# Patient Record
Sex: MALE | Race: BLACK OR AFRICAN AMERICAN
[De-identification: names, ages, dates, MRNs, and addresses within clinical notes are randomized per-mention and may not be internally consistent; named-entity substitution may affect disease eponyms.]

---

## 2020-03-05 ENCOUNTER — HOSPITAL ENCOUNTER (EMERGENCY)
Dept: HOSPITAL 62 - ER | Age: 60
Discharge: HOME | End: 2020-03-05
Payer: SELF-PAY

## 2020-03-05 VITALS — SYSTOLIC BLOOD PRESSURE: 136 MMHG | DIASTOLIC BLOOD PRESSURE: 92 MMHG

## 2020-03-05 DIAGNOSIS — K08.409: ICD-10-CM

## 2020-03-05 DIAGNOSIS — R63.4: ICD-10-CM

## 2020-03-05 DIAGNOSIS — K76.0: ICD-10-CM

## 2020-03-05 DIAGNOSIS — Z79.899: ICD-10-CM

## 2020-03-05 DIAGNOSIS — K05.10: ICD-10-CM

## 2020-03-05 DIAGNOSIS — F17.210: ICD-10-CM

## 2020-03-05 DIAGNOSIS — Z91.038: ICD-10-CM

## 2020-03-05 DIAGNOSIS — I10: ICD-10-CM

## 2020-03-05 DIAGNOSIS — R10.13: Primary | ICD-10-CM

## 2020-03-05 LAB
ADD MANUAL DIFF: NO
ALBUMIN SERPL-MCNC: 4.2 G/DL (ref 3.5–5)
ALP SERPL-CCNC: 102 U/L (ref 38–126)
ANION GAP SERPL CALC-SCNC: 5 MMOL/L (ref 5–19)
APPEARANCE UR: CLEAR
APTT PPP: YELLOW S
AST SERPL-CCNC: 33 U/L (ref 17–59)
BASOPHILS # BLD AUTO: 0 10^3/UL (ref 0–0.2)
BASOPHILS NFR BLD AUTO: 0.4 % (ref 0–2)
BILIRUB DIRECT SERPL-MCNC: 0 MG/DL (ref 0–0.4)
BILIRUB SERPL-MCNC: 0.4 MG/DL (ref 0.2–1.3)
BILIRUB UR QL STRIP: NEGATIVE
BUN SERPL-MCNC: 13 MG/DL (ref 7–20)
CALCIUM: 9.6 MG/DL (ref 8.4–10.2)
CHLORIDE SERPL-SCNC: 108 MMOL/L (ref 98–107)
CO2 SERPL-SCNC: 26 MMOL/L (ref 22–30)
EOSINOPHIL # BLD AUTO: 0.1 10^3/UL (ref 0–0.6)
EOSINOPHIL NFR BLD AUTO: 2.1 % (ref 0–6)
ERYTHROCYTE [DISTWIDTH] IN BLOOD BY AUTOMATED COUNT: 13.9 % (ref 11.5–14)
GLUCOSE SERPL-MCNC: 88 MG/DL (ref 75–110)
GLUCOSE UR STRIP-MCNC: NEGATIVE MG/DL
HCT VFR BLD CALC: 43.2 % (ref 37.9–51)
HGB BLD-MCNC: 14.7 G/DL (ref 13.5–17)
KETONES UR STRIP-MCNC: (no result) MG/DL
LYMPHOCYTES # BLD AUTO: 2.5 10^3/UL (ref 0.5–4.7)
LYMPHOCYTES NFR BLD AUTO: 36.7 % (ref 13–45)
MCH RBC QN AUTO: 31.6 PG (ref 27–33.4)
MCHC RBC AUTO-ENTMCNC: 34.1 G/DL (ref 32–36)
MCV RBC AUTO: 93 FL (ref 80–97)
MONOCYTES # BLD AUTO: 0.5 10^3/UL (ref 0.1–1.4)
MONOCYTES NFR BLD AUTO: 7.6 % (ref 3–13)
NEUTROPHILS # BLD AUTO: 3.6 10^3/UL (ref 1.7–8.2)
NEUTS SEG NFR BLD AUTO: 53.2 % (ref 42–78)
PH UR STRIP: 5 [PH] (ref 5–9)
PLATELET # BLD: 210 10^3/UL (ref 150–450)
POTASSIUM SERPL-SCNC: 4.4 MMOL/L (ref 3.6–5)
PROT SERPL-MCNC: 7.1 G/DL (ref 6.3–8.2)
PROT UR STRIP-MCNC: NEGATIVE MG/DL
RBC # BLD AUTO: 4.66 10^6/UL (ref 4.35–5.55)
SP GR UR STRIP: 1.02
TOTAL CELLS COUNTED % (AUTO): 100 %
UROBILINOGEN UR-MCNC: NEGATIVE MG/DL (ref ?–2)
WBC # BLD AUTO: 6.8 10^3/UL (ref 4–10.5)

## 2020-03-05 PROCEDURE — 76705 ECHO EXAM OF ABDOMEN: CPT

## 2020-03-05 PROCEDURE — 81001 URINALYSIS AUTO W/SCOPE: CPT

## 2020-03-05 PROCEDURE — 85025 COMPLETE CBC W/AUTO DIFF WBC: CPT

## 2020-03-05 PROCEDURE — 36415 COLL VENOUS BLD VENIPUNCTURE: CPT

## 2020-03-05 PROCEDURE — 83690 ASSAY OF LIPASE: CPT

## 2020-03-05 PROCEDURE — 99284 EMERGENCY DEPT VISIT MOD MDM: CPT

## 2020-03-05 PROCEDURE — 80053 COMPREHEN METABOLIC PANEL: CPT

## 2020-03-05 NOTE — ER DOCUMENT REPORT
ED General





- General


Chief Complaint: Abdominal Pain


Stated Complaint: ABDOMINAL PAIN, NAUSEA,TOOTH PAIN


Time Seen by Provider: 03/05/20 19:22


Primary Care Provider: 


[facility] [Primary Care Provider] - Follow up as needed


Mode of Arrival: Ambulatory


TRAVEL OUTSIDE OF THE [place] IN LAST 30 DAYS: No





- HPI


Notes: 





59-year-old male presenting for evaluation of abdominal pain and gum pain.  This

gentleman is followed by [facility] and has a history of 

hypertension for which she takes lisinopril.  He notes a 2-week history of inte

rmittent mild epigastric cramping occurring about an hour after eating.  No 

change in bowel habits.  He is lost about 10 pounds.  Denies vomiting, melena, 

hematochezia, dysuria, fever or chills.





Patient smokes about a pack of cigarettes per day.  He denies abuse of alcohol, 

salicylates or NSAIDs.  He has no known personal history of cholelithiasis.  He 

denies any prior abdominal surgery.





Additionally patient complains of some pain in the gum in the right mandibular 

area.  This is a site of a recent extraction.





- Related Data


Allergies/Adverse Reactions: 


                                        





No Known Drug Allergies Allergy (Verified 03/05/20 20:09)


   


wasp Allergy (Uncoded 09/30/19 17:31)


   











Past Medical History





- General


Information source: Patient





- Social History


Smoking Status: Current Every Day Smoker


Family History: CAD


Patient has suicidal ideation: No


Patient has homicidal ideation: No





- Past Medical History


Cardiac Medical History: Reports: Hx Hypertension


   Denies: Hx Heart Attack


Pulmonary Medical History: Reports: Hx Bronchitis


   Denies: Hx Asthma


Neurological Medical History: Denies: Hx Cerebrovascular Accident, Hx Seizures


Renal/ Medical History: Denies: Hx Peritoneal Dialysis


GI Medical History: Denies: Hx Hepatitis, Hx Hiatal Hernia, Hx Ulcer


Infectious Medical History: Denies: Hx Hepatitis


Past Surgical History: Denies: Hx Open Heart Surgery, Hx Pacemaker





- Immunizations


Immunizations up to date: Yes


Hx Diphtheria, Pertussis, Tetanus Vaccination: Yes





Review of Systems





- Review of Systems


Notes: 





Constitutional: Negative for fever.


HENT: Negative for sore throat.


Eyes: Negative for visual changes.


Cardiovascular: Negative for chest pain.


Respiratory: Negative for shortness of breath.


Gastrointestinal: As per HPI.


Genitourinary: Negative for dysuria.


Musculoskeletal: Negative for back pain.


Skin: Negative for rash.


Neurological: Negative for headaches, weakness or numbness.





10 point ROS negative except as marked above and in HPI.








Physical Exam





- Vital signs


Vitals: 





                                        











Temp Pulse Resp BP Pulse Ox


 


 98.4 F   85   20   134/91 H  99 


 


 03/05/20 19:21  03/05/20 19:21  03/05/20 19:21  03/05/20 19:21  03/05/20 19:21














- Notes


Notes: 











GENERAL: Well-developed well-nourished appearing in no acute distress.





SKIN: Good turgor no rashes.





HEAD: Normocephalic atraumatic.





EYES: PERRLA.  EOMI.  Conjunctivae and sclerae clear.





EARS: CANALS AND TMS CLEAR.





NOSE: CLEAR.





MOUTH: Moist mucosa.  Poor dentition with multiple missing teeth.  Patient has 

had extraction of his second mandibular molar on the right and the gum in this 

location is mildly inflamed and tender to palpation..  No stridor or edema.  No 

drooling.





NECK: Supple.  No masses or thyromegaly.  No adenopathy.  Carotids 2+ without 

bruits.  No JVD.





BACK: Symmetrical without tenderness.





CHEST: Respirations unlabored.  Breath sounds clear and symmetrical.





HEART: Regular rhythm.  No murmur gallop or rub.





ABDOMEN: Minimal epigastric tenderness.  Soft without masses, organomegaly or 

rebound.  Bowel sounds normally active.  No bruits.





GENITALIA: Deferred.





EXTREMITIES: No edema.  No calf tenderness.  Cap refill less than 1.5 seconds.  

Dorsalis pedis and posterior tibial pulses 3+ and symmetrical.





NEUROLOGICAL: GCS 15.  Alert and oriented x3.  Normal gait.  Fluent speech.  

Cranial nerves II through XII intact.  Sensorimotor and cerebellar normal.  

Normal tone.





PSYCHIATRIC: Appropriate affect.





Course





- Re-evaluation


Re-evalutation: 





03/05/20 23:07


Abdominal ultrasound showed some fatty infiltration of liver but no gallstones 

and no aortic aneurysm.  Lipase is normal.  Comprehensive metabolic profile and 

CBC are normal.  Urinalysis is normal.





I think this man can go out with some omeprazole and follow-up with his primary 

care physician on outpatient basis.  If he continues to have abdominal 

discomfort he will need referral to a gastroenterologist for consideration of 

upper GI endoscopy.





I will place him on some amoxicillin also for the inflammation of his gum and 

asked him to follow-up with his dentist as soon as possible.





- Vital Signs


Vital signs: 





                                        











Temp Pulse Resp BP Pulse Ox


 


 98.4 F   85   20   134/91 H  99 


 


 03/05/20 19:21  03/05/20 19:21  03/05/20 19:21  03/05/20 19:21  03/05/20 19:21














- Laboratory


Result Diagrams: 


                                 03/05/20 19:55





                                 03/05/20 19:55


Laboratory results interpreted by me: 





                                        











  03/05/20 03/05/20





  19:55 19:55


 


Chloride  108 H 


 


Urine Ketones   TRACE H


 


Urine Ascorbic Acid   40 H














Discharge





- Discharge


Clinical Impression: 


 Gingivitis





Abdominal pain


Qualifiers:


 Abdominal location: epigastric Qualified Code(s): R10.13 - Epigastric pain





Condition: Stable


Disposition: HOME, SELF-CARE


Instructions:  Abdominal Pain ([facility])


Prescriptions: 


Omeprazole 40 mg PO DAILY #30 capsule.


Referrals: 


[facility] [Primary [facility] Provider] - Follow up as needed

## 2020-03-05 NOTE — RADIOLOGY REPORT (SQ)
US ABDOMEN LIMITED



EXAM DATE: 3/5/2020 7:24 PM CST



HISTORY: Right upper quadrant pain.



COMPARISON: None.



TECHNIQUE: Grayscale and color Doppler imaging of the right upper

quadrant was performed.



FINDINGS:



There is increased echogenicity of the hepatic parenchyma, likely

representing hepatic steatosis. The main portal vein has normal

hepatopetal flow.



No shadowing gallstones are seen. No pericholecystic fluid or

gallbladder wall thickening. The common bile duct is normal

caliber.



The visualized portions of the pancreas are unremarkable.  



No hydronephrosis or shadowing renal stones are identified. The

right kidney is normal in size. There is a septated cyst in the

upper pole measuring 3.1 x 2.6 cm.



The visualized portions of the IVC and aorta are patent.



IMPRESSION: 



1.  No gallstones or acute cholecystitis.

2.  Hepatic steatosis.

3.  3 cm septated cyst in the right kidney.

## 2020-03-05 NOTE — ER DOCUMENT REPORT
ED Medical Screen (RME)





- General


Chief Complaint: Abdominal Pain


Stated Complaint: ABDOMINAL PAIN, NAUSEA,TOOTH PAIN


Time Seen by Provider: 03/05/20 19:22


[facility]: 


[facility] - Follow up as needed


Mode of Arrival: Ambulatory


Information source: Patient


Notes: 





59-year-old male presented to ED for complaint of upper abdominal pain to the 

center and left with nausea and vomiting since last Wednesday.  He states he 

also had a dental pain at the same time and he was seen for the dentist to pull 

the tooth but has not been to a medical doctor.  He states he still has pain 

when he pulled the tooth but he can put some Orajel on that and that helps with 

the pain and the headache.  Patient is alert oriented respirations regular 

nonlabored speaking in full sentences.  Lungs are clear to auscultation he does 

have bowel sounds he does have tenderness to the upper abdomen similar to the 

left.














I have greeted and performed a rapid initial assessment of this patient.  A 

comprehensive ED assessment and evaluation of the patient, analysis of test 

results and completion of medical decision making process will be conducted by 

an additional ED providers.


TRAVEL OUTSIDE OF THE [place] IN LAST 30 DAYS: No





- Related Data


Allergies/Adverse Reactions: 


                                        





No Known Drug Allergies Allergy (Verified 09/30/19 17:31)


   


wasp Allergy (Uncoded 09/30/19 17:31)


   











Past Medical History





- Past Medical History


Cardiac Medical History: Reports: Hx Hypertension


   Denies: Hx Heart Attack


Pulmonary Medical History: Reports: Hx Bronchitis


   Denies: Hx Asthma


Neurological Medical History: Denies: Hx Cerebrovascular Accident, Hx Seizures


Renal/ Medical History: Denies: Hx Peritoneal Dialysis


GI Medical History: Denies: Hx Hepatitis, Hx Hiatal Hernia, Hx Ulcer


Infectious Medical History: Denies: Hx Hepatitis


Past Surgical History: Denies: Hx Open Heart Surgery, Hx Pacemaker





- Immunizations


Immunizations up to date: Yes


Hx Diphtheria, Pertussis, Tetanus Vaccination: Yes





Doctor's Discharge





- Discharge


Referrals: 


[facility] - Follow up as needed

## 2020-05-02 ENCOUNTER — HOSPITAL ENCOUNTER (EMERGENCY)
Dept: HOSPITAL 62 - ER | Age: 60
Discharge: HOME | End: 2020-05-02
Payer: SELF-PAY

## 2020-05-02 VITALS — SYSTOLIC BLOOD PRESSURE: 142 MMHG | DIASTOLIC BLOOD PRESSURE: 89 MMHG

## 2020-05-02 DIAGNOSIS — F17.200: ICD-10-CM

## 2020-05-02 DIAGNOSIS — M79.631: ICD-10-CM

## 2020-05-02 DIAGNOSIS — I10: ICD-10-CM

## 2020-05-02 DIAGNOSIS — R20.2: Primary | ICD-10-CM

## 2020-05-02 PROCEDURE — 73090 X-RAY EXAM OF FOREARM: CPT

## 2020-05-02 PROCEDURE — S0119 ONDANSETRON 4 MG: HCPCS

## 2020-05-02 PROCEDURE — 99283 EMERGENCY DEPT VISIT LOW MDM: CPT

## 2020-05-02 PROCEDURE — 73070 X-RAY EXAM OF ELBOW: CPT

## 2020-05-02 NOTE — RADIOLOGY REPORT (SQ)
EXAM DESCRIPTION:  ELBOW RIGHT AP/LAT; FOREARM RIGHT



IMAGES COMPLETED DATE/TIME:  5/2/2020 3:31 pm



REASON FOR STUDY:  injury



COMPARISON:  None.



NUMBER OF VIEWS:  Four views.



TECHNIQUE:  AP and lateral radiographic images acquired of the right elbow and right forearm.



LIMITATIONS:  None.



FINDINGS:  MINERALIZATION: Normal.

BONES: No acute fracture or dislocation.  No worrisome bone lesions.

JOINT: No effusion.

SOFT TISSUES: No soft tissue swelling.  No foreign body.

OTHER: No other significant finding.



IMPRESSION:  No radiographic abnormality of the right forearm or elbow.



TECHNICAL DOCUMENTATION:  JOB ID:  [id number]

 [employer]- All Rights Reserved



Reading location - IP/workstation name: [email or web address]

## 2020-05-02 NOTE — ER DOCUMENT REPORT
ED General





- General


Chief Complaint: Arm Pain


Stated Complaint: ARM PAIN


Time Seen by Provider: 05/02/20 16:08


TRAVEL OUTSIDE OF THE [place] IN LAST 30 DAYS: No





- HPI


Notes: 





Patient is a 60-year-old gentleman who presents to the emergency department for 

evaluation.  He states he was at a grocery store, bumped the right medial 

forearm against a grocery cart, and developed numbness on the medial aspect of 

the forearm and into the fifth finger.  He states he has some mild pain in his 

elbow as well.  He denies any difficulty seeing, speaking, swallowing.  Moving 

his arms and legs without difficulty.  No other sensory deficits.





- Related Data


Allergies/Adverse Reactions: 


                                        





No Known Drug Allergies Allergy (Verified 05/02/20 15:47)


   


wasp Allergy (Uncoded 05/02/20 15:47)


   








Home Medications: Lisinopril, aspirin





Past Medical History





- General


Information source: Patient





- Social History


Smoking Status: Current Every Day Smoker


Family History: CAD


Patient has homicidal ideation: No





- Past Medical History


Cardiac Medical History: Reports: Hx Hypertension


   Denies: Hx Heart Attack


Pulmonary Medical History: Reports: Hx Bronchitis


   Denies: Hx Asthma


Neurological Medical History: Denies: Hx Cerebrovascular Accident, Hx Seizures


Renal/ Medical History: Denies: Hx Peritoneal Dialysis


GI Medical History: Denies: Hx Hepatitis, Hx Hiatal Hernia, Hx Ulcer


Infectious Medical History: Denies: Hx Hepatitis


Past Surgical History: Denies: Hx Open Heart Surgery, Hx Pacemaker





- Immunizations


Immunizations up to date: Yes


Hx Diphtheria, Pertussis, Tetanus Vaccination: Yes





Review of Systems





- Review of Systems


Musculoskeletal: See HPI


Neurological/Psychological: See HPI


-: Yes All other systems reviewed and negative





Physical Exam





- Vital signs


Vitals: 





                                        











Temp Pulse Resp BP Pulse Ox


 


 98.0 F   108 H  16   143/82 H  99 


 


 05/02/20 15:38  05/02/20 15:38  05/02/20 15:38  05/02/20 15:38  05/02/20 15:38














- Notes


Notes: 





Vital signs reviewed, please refer to chart. Head is normocephalic, atraumatic. 

Pupils equal round, reactive to light.  Neck is supple without meningismus.  

Heart is regular rate and rhythm.  Lungs are clear to auscultation bilaterally. 

Abdomen is soft, nontender, normoactive bowel sounds throughout.  Extremities 

without cyanosis, clubbing. Posterior calves are nontender.  Patient is awake, 

alert, oriented x3.  Cranial nerves II - XII are grossly intact without focal 

neurological deficits.  Strength is plus 5 out of 5 bilateral upper and lower 

extremities.  Sensation is intact, with the exception of mild diminished 

sensation to light touch over the ulnar nerve distribution on the right upper 

extremity..  Reflexes symmetrical.  Intact finger-nose-finger, rapid alternating

movements, heel-to-shin.





Course





- Re-evaluation


Re-evalutation: 





05/02/20 17:22


Patient presents to the emergency department for evaluation.  After sustaining 

minimal trauma to the right upper extremity he has some mild sensory deficit in 

the distribution of the ulnar nerve.  He has no weakness.  He has no other 

neurological deficits.  X-rays were unremarkable.  Patient is given a dose of 

steroids, will send him home with steroids for inflammation that may be causing 

his sensory deficit.  He is to follow-up closely with primary care, return to 

the ED with worsening or new concerning symptoms of any sort.





- Vital Signs


Vital signs: 





                                        











Temp Pulse Resp BP Pulse Ox


 


 98.0 F   108 H  16   143/82 H  99 


 


 05/02/20 15:42  05/02/20 15:38  05/02/20 15:38  05/02/20 15:38  05/02/20 15:38














Discharge





- Discharge


Clinical Impression: 


 Paresthesia of right upper extremity





Condition: Stable


Disposition: HOME, SELF-CARE


Instructions:  Numbness or Paresthesia ([facility])


Additional Instructions: 


The numbness and tingling sensation you are experiencing is likely from mild 

trauma to the ulnar nerve.  Please take the steroids as directed, starting your 

prescription tomorrow.  If you develop weakness, increased numbness, difficulty 

seeing, speaking, swallowing, or any other new or concerning symptoms, please 

return immediately to the emergency department for reevaluation.

## 2020-05-02 NOTE — RADIOLOGY REPORT (SQ)
EXAM DESCRIPTION:  ELBOW RIGHT AP/LAT; FOREARM RIGHT



IMAGES COMPLETED DATE/TIME:  5/2/2020 3:31 pm



REASON FOR STUDY:  injury



COMPARISON:  None.



NUMBER OF VIEWS:  Four views.



TECHNIQUE:  AP and lateral radiographic images acquired of the right elbow and right forearm.



LIMITATIONS:  None.



FINDINGS:  MINERALIZATION: Normal.

BONES: No acute fracture or dislocation.  No worrisome bone lesions.

JOINT: No effusion.

SOFT TISSUES: No soft tissue swelling.  No foreign body.

OTHER: No other significant finding.



IMPRESSION:  No radiographic abnormality of the right forearm or elbow.



TECHNICAL DOCUMENTATION:  JOB ID:  [id number]

 [employer]- All Rights Reserved



Reading location - IP/workstation name: [email or web address]